# Patient Record
Sex: FEMALE | Race: BLACK OR AFRICAN AMERICAN | Employment: OTHER | URBAN - METROPOLITAN AREA
[De-identification: names, ages, dates, MRNs, and addresses within clinical notes are randomized per-mention and may not be internally consistent; named-entity substitution may affect disease eponyms.]

---

## 2021-12-04 ENCOUNTER — HOSPITAL ENCOUNTER (EMERGENCY)
Age: 76
Discharge: HOME | End: 2021-12-04
Attending: STUDENT IN AN ORGANIZED HEALTH CARE EDUCATION/TRAINING PROGRAM
Payer: MEDICARE

## 2021-12-04 VITALS
DIASTOLIC BLOOD PRESSURE: 88 MMHG | HEART RATE: 87 BPM | HEIGHT: 60 IN | SYSTOLIC BLOOD PRESSURE: 162 MMHG | WEIGHT: 180 LBS | BODY MASS INDEX: 35.34 KG/M2 | TEMPERATURE: 98.1 F | OXYGEN SATURATION: 99 % | RESPIRATION RATE: 16 BRPM

## 2021-12-04 DIAGNOSIS — M79.645 PAIN OF FINGER OF LEFT HAND: Primary | ICD-10-CM

## 2021-12-04 PROCEDURE — 6360000002 HC RX W HCPCS: Performed by: STUDENT IN AN ORGANIZED HEALTH CARE EDUCATION/TRAINING PROGRAM

## 2021-12-04 PROCEDURE — 99283 EMERGENCY DEPT VISIT LOW MDM: CPT

## 2021-12-04 PROCEDURE — 90471 IMMUNIZATION ADMIN: CPT | Performed by: STUDENT IN AN ORGANIZED HEALTH CARE EDUCATION/TRAINING PROGRAM

## 2021-12-04 PROCEDURE — 90715 TDAP VACCINE 7 YRS/> IM: CPT | Performed by: STUDENT IN AN ORGANIZED HEALTH CARE EDUCATION/TRAINING PROGRAM

## 2021-12-04 RX ORDER — MUPIROCIN CALCIUM 20 MG/G
CREAM TOPICAL
Qty: 15 G | Refills: 0 | Status: SHIPPED | OUTPATIENT
Start: 2021-12-04 | End: 2022-01-03

## 2021-12-04 RX ADMIN — TETANUS TOXOID, REDUCED DIPHTHERIA TOXOID AND ACELLULAR PERTUSSIS VACCINE, ADSORBED 0.5 ML: 5; 2.5; 8; 8; 2.5 SUSPENSION INTRAMUSCULAR at 16:58

## 2021-12-04 ASSESSMENT — ENCOUNTER SYMPTOMS
RHINORRHEA: 0
DIARRHEA: 0
VOMITING: 0
SHORTNESS OF BREATH: 0
ABDOMINAL PAIN: 0
COUGH: 0
CONSTIPATION: 0
NAUSEA: 0

## 2021-12-04 ASSESSMENT — PAIN DESCRIPTION - LOCATION: LOCATION: FINGER (COMMENT WHICH ONE)

## 2021-12-04 ASSESSMENT — PAIN DESCRIPTION - ORIENTATION: ORIENTATION: LEFT

## 2021-12-04 ASSESSMENT — PAIN SCALES - GENERAL: PAINLEVEL_OUTOF10: 5

## 2021-12-04 ASSESSMENT — PAIN DESCRIPTION - PAIN TYPE: TYPE: ACUTE PAIN

## 2021-12-04 NOTE — ED TRIAGE NOTES
Pt arrived to ED with pain in left ring finger after trying to get ring off. States she has had swelling in left hand for 2 weeks. [facility] ring [facility] today.

## 2021-12-04 NOTE — ED PROVIDER NOTES
[address] [facility] RESIDENT NOTE       Date of evaluation: 12/4/2021    Chief Complaint     Finger Pain (left ring finger pain after trying to take ring off)      History of Present Illness     Mary Soria is a 67 y.o. female who presents to the emergency department for finger pain. Patient previously had 2 rings on her left ring finger. She noticed increasing swelling to the left ring finger and had concerns that she was unable to get the rings off. She saw her jeweler today who cut off one of the rings. Beneath the ring there was swelling and discoloration of the skin. Patient was concerned and presents to the emergency department for evaluation. Patient has had increasing pain since the ring was cut off which she currently rates a 5/10 in severity. She denies any other trauma or injury. Review of Systems     Review of Systems   Constitutional: Negative for chills and fever. HENT: Negative for congestion and rhinorrhea. Respiratory: Negative for cough and shortness of breath. Cardiovascular: Negative for chest pain and leg swelling. Gastrointestinal: Negative for abdominal pain, constipation, diarrhea, nausea and vomiting. Genitourinary: Negative for dysuria and hematuria. Musculoskeletal: Positive for arthralgias. Left ring finger pain and swelling   Skin: Negative for rash. Neurological: Negative for syncope, light-headedness and headaches. Psychiatric/Behavioral: Negative for behavioral problems. All other systems reviewed and are negative. Past Medical, Surgical, Family, and Social History     She has no past medical history on file. She has no past surgical history on file. Her family history is not on file. She reports that she has never smoked. She has never used smokeless tobacco. She reports current alcohol use. She reports that she does not use drugs.     Medications     Previous Medications    No medications on file       Allergies     She is allergic to penicillins. Physical Exam     INITIAL VITALS: BP: (!) 156/82, Temp: 98.1 °F (36.7 °C), Pulse: 92, Resp: 18, SpO2: 94 %   Physical Exam  Vitals and nursing note reviewed. Constitutional:       General: She is not in acute distress. Appearance: Normal appearance. She is not toxic-appearing. HENT:      Head: Normocephalic and atraumatic. Nose: Nose normal.      Mouth/Throat:      Mouth: Mucous membranes are moist.   Eyes:      Extraocular Movements: Extraocular movements intact. Pupils: Pupils are equal, round, and reactive to light. Cardiovascular:      Rate and Rhythm: Normal rate and regular rhythm. Pulmonary:      Effort: Pulmonary effort is normal. No respiratory distress. Abdominal:      General: Abdomen is flat. There is no distension. Palpations: Abdomen is soft. Musculoskeletal:         General: Normal range of motion. Cervical back: Normal range of motion and neck supple. Comments: Left ring finger with ring in place. Just distal to the ring there is skin discoloration with mild erythema and mild swelling. Associated tenderness to palpation. No signs of infection. Skin:     General: Skin is warm and dry. Capillary Refill: Capillary refill takes less than 2 seconds. Findings: No rash. Neurological:      General: No focal deficit present. Mental Status: She is alert and oriented to person, place, and time. Psychiatric:         Mood and Affect: Mood normal.         Behavior: Behavior normal.         DiagnosticResults     EKG   None. RADIOLOGY:  No orders to display       LABS:   No results found for this visit on 12/04/21. ED BEDSIDE ULTRASOUND:  None. RECENT VITALS:  BP: (!) 156/82, Temp: 98.1 °F (36.7 °C), Pulse: 92,Resp: 18, SpO2: 94 %     Procedures     Ring removal was completed at bedside without any need for sedation or anesthetization.   Patient was consented verbally and understood the risks and benefits of the procedure. Ring cutter used with success in removal of ring. The procedure was uncomplicated. ED Course     Nursing Notes, Past Medical Hx, Past Surgical Hx, Social Hx, Allergies, and Family Hx were reviewed. The patient was given the followingmedications:  Orders Placed This Encounter   Medications    Tetanus-Diphth-Acell Pertussis (BOOSTRIX) injection 0.5 mL    mupirocin (BACTROBAN) 2 % cream     Sig: Apply 3 times daily. Dispense:  15 g     Refill:  0       CONSULTS:  None    MEDICAL DECISION MAKING / ASSESSMENT / Amira Boas is a 67 y.o. female presents to the emergency department with left ring finger pain and swelling after removal of the ring at the [facility]. Patient has another ring in place. Vital signs are notable for hypertension without any other abnormalities. Physical exam reveals swelling and erythema distal to the current ring on the finger. There is no evidence of active infection. Plan was made to remove the remaining ring with the ring cutter which was successful. Given the appearance of the ring and the use of the ring cutter, tetanus was updated here in the emergency department. Patient was given a prescription for mupirocin ointment for antibiotic prophylaxis. We discussed return instructions as well as follow-up with primary care provider. Patient expressed understanding is comfortable with this plan. She was ultimately discharged home in stable condition. This patient was also evaluated by the attending physician. All care plans werediscussed and agreed upon. Clinical Impression     1. Pain of finger of left hand        Disposition     PATIENT REFERRED TO:  No follow-up provider specified. DISCHARGE MEDICATIONS:  New Prescriptions    MUPIROCIN (BACTROBAN) 2 % CREAM    Apply 3 times daily.        DISPOSITION Decision To Discharge 12/04/2021 04:46:39 PM     Marissa Hall MD  12/04/21 9003

## 2022-03-27 ENCOUNTER — HOSPITAL ENCOUNTER (EMERGENCY)
Age: 77
Discharge: HOME | End: 2022-03-27
Attending: EMERGENCY MEDICINE
Payer: MEDICARE

## 2022-03-27 ENCOUNTER — APPOINTMENT (OUTPATIENT)
Dept: CT IMAGING | Age: 77
End: 2022-03-27
Payer: MEDICARE

## 2022-03-27 ENCOUNTER — APPOINTMENT (OUTPATIENT)
Dept: GENERAL RADIOLOGY | Age: 77
End: 2022-03-27
Payer: MEDICARE

## 2022-03-27 VITALS
DIASTOLIC BLOOD PRESSURE: 81 MMHG | WEIGHT: 178 LBS | OXYGEN SATURATION: 100 % | SYSTOLIC BLOOD PRESSURE: 138 MMHG | HEIGHT: 60 IN | TEMPERATURE: 98.2 F | BODY MASS INDEX: 34.95 KG/M2 | RESPIRATION RATE: 16 BRPM | HEART RATE: 97 BPM

## 2022-03-27 DIAGNOSIS — S63.502A SPRAIN OF LEFT WRIST, INITIAL ENCOUNTER: ICD-10-CM
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
## 2022-03-27 LAB
EKG ATRIAL RATE: 94 BPM
EKG DIAGNOSIS: NORMAL
EKG P AXIS: 62 DEGREES
EKG P-R INTERVAL: 154 MS
EKG Q-T INTERVAL: 384 MS
EKG QRS DURATION: 72 MS
EKG QTC CALCULATION (BAZETT): 480 MS
EKG R AXIS: 7 DEGREES
EKG T AXIS: 70 DEGREES
EKG VENTRICULAR RATE: 94 BPM

## 2022-03-27 PROCEDURE — 73030 X-RAY EXAM OF SHOULDER: CPT

## 2022-03-27 PROCEDURE — 73130 X-RAY EXAM OF HAND: CPT

## 2022-03-27 PROCEDURE — 73110 X-RAY EXAM OF WRIST: CPT

## 2022-03-27 PROCEDURE — 99282 EMERGENCY DEPT VISIT SF MDM: CPT

## 2022-03-27 PROCEDURE — 6370000000 HC RX 637 (ALT 250 FOR IP): Performed by: EMERGENCY MEDICINE

## 2022-03-27 PROCEDURE — 70450 CT HEAD/BRAIN W/O DYE: CPT

## 2022-03-27 PROCEDURE — 73080 X-RAY EXAM OF ELBOW: CPT

## 2022-03-27 PROCEDURE — 93005 ELECTROCARDIOGRAM TRACING: CPT | Performed by: EMERGENCY MEDICINE

## 2022-03-27 RX ORDER — IBUPROFEN 400 MG/1
400 TABLET ORAL ONCE
Status: DISCONTINUED | OUTPATIENT
Start: 2022-03-27 | End: 2022-03-27 | Stop reason: HOSPADM

## 2022-03-27 RX ORDER — IBUPROFEN 600 MG/1
600 TABLET ORAL EVERY 6 HOURS PRN
Qty: 28 TABLET | Refills: 0 | Status: SHIPPED | OUTPATIENT
Start: 2022-03-27 | End: 2022-04-03

## 2022-03-27 RX ORDER — ACETAMINOPHEN 325 MG/1
650 TABLET ORAL ONCE
Status: COMPLETED | OUTPATIENT
Start: 2022-03-27 | End: 2022-03-27

## 2022-03-27 RX ADMIN — ACETAMINOPHEN 650 MG: 325 TABLET ORAL at 08:23

## 2022-03-27 ASSESSMENT — PAIN SCALES - GENERAL: PAINLEVEL_OUTOF10: 8

## 2022-03-27 ASSESSMENT — ENCOUNTER SYMPTOMS
BACK PAIN: 0
ABDOMINAL PAIN: 0
COUGH: 0
SORE THROAT: 0

## 2022-03-27 NOTE — ED PROVIDER NOTES
[address] [facility]          ATTENDING PHYSICIAN NOTE       Date of evaluation: 3/27/2022    Chief Complaint     Fall (slipped and fell @ [facility] last night landed on right shoulder and right wrist, then fell again on the way home and landed on left wrist and hit head without loc)      History of Present Illness     Denise Jiang is a right-hand-dominant 65 y.o. female who presents to the emergency department for evaluation after 2 falls last evening. The first fall occurred when she slipped at the [facility], landing on her dominant right hand. She has mild soreness in the right wrist and moderate pain with movement of her right shoulder. Symptoms are worse this morning than they were last night. She did not strike her head. When she was going home, she tripped on and out of place rug, catching herself with her left arm. She did strike her forehead but did not lose consciousness. She has minor frontal throbbing headache that does not radiate, and is not associated with vision change or nausea, neck pain, or weakness. The worst of her pain is in her left wrist, ulnar aspect, that is worse to touch or movement. No associated numbness or tingling. She did not seek care last night because she was hoping her symptoms would resolve. She did not take any medications prior to arrival for her discomfort. On review of systems, she complains of minor central chest discomfort which she has frequently and is unchanged in character, quality, location, and any other details. She would not seek medical evaluation for the chest pain independently and is not concerned by it. Otherwise, no cough, fever, shortness of breath, palpitations, leg pain or swelling. Review of Systems     Review of Systems   Constitutional: Negative for fever. HENT: Negative for sore throat. Respiratory: Negative for cough. Cardiovascular: Negative for palpitations. Gastrointestinal: Negative for abdominal pain. Genitourinary: Negative for flank pain. Musculoskeletal: Negative for back pain and neck pain. Hematological: Does not bruise/bleed easily. All other systems reviewed and are negative. Past Medical, Surgical, Family, and Social History     **See ED course for additional past medical history, surgical history, and medications from outside hospital chart**    She has no past medical history on file. She has no past surgical history on file. Her family history is not on file. She reports that she has never smoked. She has never used smokeless tobacco. She reports current alcohol use. She reports that she does not use drugs. Medications     Previous Medications    No medications on file       Allergies     She is allergic to penicillins. Physical Exam     INITIAL VITALS: /81   Pulse 97   Temp 98.2 °F (36.8 °C) (Oral)   Resp 16   Ht 5' (1.524 m)   Wt 178 lb (80.7 kg)   SpO2 100%   BMI 34.76 kg/m²    General: Well developed, well nourished female in no acute distress. HEENT: Sclera white, conjunctiva pink, mucous membranes moist and oropharynx clear  Neck: Supple, no meningismus or JVD, normal range of motion of the neck without difficulty, no midline cervical spine tenderness  Respirations: Unlabored with symmetric chest rise and fall  CV: Regular rate and rhythm with strong distal pulses  Abd: Soft without rebound guarding distention or focal tenderness  Musculoskeletal: Moves all extremities with no signs of orthopedic trauma or edema. No midline thoracolumbar tenderness to palpation. She does have tenderness to palpation over the right anterior glenohumeral joint without deformity. Also tenderness palpation of the ulnar aspect of the left wrist without palpable deformity. Mild pain with range of motion of the left wrist.  Skin: Warm and dry with no rashes or lesions. Neuro: Awake and alert with no focal neurologic deficits. Normal speech and gait. Psych: Mood and affect are normal.    Diagnostic Results     EKG   Indication: Chest discomfort. Interpreted by me. Normal sinus rhythm, normal axis and intervals, rate is 94.  2 PVCs are seen. No ST segment or T wave changes. Interpretation: Normal sinus rhythm with occasional PVCs, no signs of acute ischemia, infarction, or serious dysrhythmia. RADIOLOGY:  XR SHOULDER RIGHT (MIN 2 VIEWS)   Final Result      No acute fracture seen. XR SHOULDER LEFT (MIN 2 VIEWS)   Final Result      No acute fracture seen. XR ELBOW LEFT (MIN 3 VIEWS)   Final Result      Tiny bony density noted adjacent to olecranon of doubtful significance but should be correlated clinically to exclude acute avulsion fracture. Otherwise no fracture seen. XR HAND RIGHT (MIN 3 VIEWS)   Final Result      No acute fracture seen. XR HAND LEFT (MIN 3 VIEWS)   Final Result      No acute fracture seen. XR WRIST RIGHT (MIN 3 VIEWS)   Final Result      No acute fracture seen. XR WRIST LEFT (MIN 3 VIEWS)   Final Result      No acute fracture seen. XR ELBOW RIGHT (MIN 3 VIEWS)   Final Result      No acute fracture seen. CT Head WO Contrast   Final Result      Chronic changes as above. No acute hemorrhage or mass effect identified. LABS:   Results for orders placed or performed during the hospital encounter of 03/27/22   EKG 12 Lead   Result Value Ref Range    Ventricular Rate 94 BPM    Atrial Rate 94 BPM    P-R Interval 154 ms    QRS Duration 72 ms    Q-T Interval 384 ms    QTc Calculation (Bazett) 480 ms    P Axis 62 degrees    R Axis 7 degrees    T Axis 70 degrees    Diagnosis       EKG performed in ER and to be interpreted by ER physician. Confirmed by MD, ER ([id number]),  Bowen Joe ([phone]) on 3/27/2022 8:22:06 AM       RECENT VITALS:  BP: 138/81, Temp: 98.2 °F (36.8 °C), Pulse: 97, Resp: 16, SpO2: 100 %       ED Course     Nursing Notes, Past Medical Hx, Past Surgical Hx, Social Hx, Allergies, and Family Hx were reviewed. The patient was given the following medications:  Orders Placed This Encounter   Medications    acetaminophen (TYLENOL) tablet 650 mg    ibuprofen (ADVIL;MOTRIN) tablet 400 mg    ibuprofen (ADVIL;MOTRIN) 600 MG tablet     Sig: Take 1 tablet by mouth every 6 hours as needed for Pain     Dispense:  28 tablet     Refill:  0     ED Course as of 03/27/22 0850   Sun Mar 27, 2022   0715 From 6/1/21 Ann Hurst PCP Note:  Current Outpatient Medications   Medication Sig   · amLODIPine Take 1 tablet (10 mg total) by mouth daily. · aspirin Take 1 tablet (81 mg total) by mouth daily with breakfast. Indications: myocardial infarction prevention   · atorvastatin Take 1 tablet (40 mg total) by mouth daily. · isosorbide mononitrate Take 0.5 tablets (15 mg total) by mouth daily. · lisinopriL Take 1 tablet (5 mg total) by mouth daily. · metFORMIN Take 1 tablet (850 mg total) by mouth 2 times a day with meals. · metoprolol succinate Take 1 tablet (25 mg total) by mouth daily. · nitroGLYCERIN Dissolve 1 tablet under the tongue for chest pain. Repeat if needed every 5 minutes for 2 more doses. Indications: angina     CAD; S/P coronary artery stent placement  Subclinical Hyperthyroidism  Clinical Depression  Hypertension  Diabetes []   0846 XR ELBOW LEFT (MIN 3 VIEWS)  No pain to palpation, flexion extension, pronation supination to the left elbow []      ED Course User Index  [JM] Yaw Osuna MD     She was seen and examined on arrival to the treatment area. Nursing notes and computerized medical records were reviewed. Symptoms treated in the emergency department. I personally discussed results, aftercare, and return precautions, and she expresses understanding.     MEDICAL DECISION MAKING / ASSESSMENT / Sunshine Durán is a 65 y.o. female presents to the emergency department for evaluation of bilateral upper extremity injuries after 2 mechanical slip and falls. She did strike her head. CT scan of the head was obtained based on age and aspirin use, with low clinical suspicion for TBI. X-rays were obtained of areas that were causing her discomfort. No clinical correlation for subtle abnormality of the left elbow x-ray. I believe she is suffering from strain/sprains, and can be treated conservatively. No indication based on overall evaluation that there was a preceding medical event prior to either or both of these falls. She does mention minor chronic stable chest discomfort, with a reassuring EKG. I am not concerned for active acute coronary syndrome or other intrathoracic process. Clinical Impression     1. Fall on same level from slipping, tripping or stumbling, initial encounter    2.  Sprain of left wrist, initial encounter        Disposition     PATIENT REFERRED TO:  MD Eusebio Munguia [place] [id number] [facility]       For re-evaluation, follow-up, and discuss ED visit    [facility] Emergency Department  [address] [place] [address]  [phone]    As needed, If symptoms worsen      DISCHARGE MEDICATIONS:  New Prescriptions    IBUPROFEN (ADVIL;MOTRIN) 600 MG TABLET    Take 1 tablet by mouth every 6 hours as needed for Pain       DISPOSITION    Discharged home in good condition     Derek Dunn MD  03/27/22 8253

## 2023-09-20 ENCOUNTER — HOSPITAL ENCOUNTER (EMERGENCY)
Age: 78
Discharge: HOME | End: 2023-09-20
Attending: EMERGENCY MEDICINE
Payer: MEDICARE

## 2023-09-20 VITALS
TEMPERATURE: 98.1 F | RESPIRATION RATE: 20 BRPM | HEIGHT: 60 IN | BODY MASS INDEX: 34 KG/M2 | DIASTOLIC BLOOD PRESSURE: 61 MMHG | OXYGEN SATURATION: 96 % | HEART RATE: 85 BPM | WEIGHT: 173.19 LBS | SYSTOLIC BLOOD PRESSURE: 140 MMHG

## 2023-09-20 DIAGNOSIS — R21 RASH AND NONSPECIFIC SKIN ERUPTION: Primary | ICD-10-CM

## 2023-09-20 LAB
ALBUMIN SERPL-MCNC: 4.2 G/DL (ref 3.4–5)
ALBUMIN/GLOB SERPL: 1.2 {RATIO} (ref 1.1–2.2)
ALP SERPL-CCNC: 53 U/L (ref 40–129)
ALT SERPL-CCNC: <5 U/L (ref 10–40)
ANION GAP SERPL CALCULATED.3IONS-SCNC: 9 MMOL/L (ref 3–16)
AST SERPL-CCNC: 13 U/L (ref 15–37)
BASOPHILS # BLD: 0.1 K/UL (ref 0–0.2)
BASOPHILS NFR BLD: 1.1 %
BILIRUB SERPL-MCNC: 0.3 MG/DL (ref 0–1)
BUN SERPL-MCNC: 16 MG/DL (ref 7–20)
CALCIUM SERPL-MCNC: 9.3 MG/DL (ref 8.3–10.6)
CHLORIDE SERPL-SCNC: 103 MMOL/L (ref 99–110)
CO2 SERPL-SCNC: 27 MMOL/L (ref 21–32)
CREAT SERPL-MCNC: 0.8 MG/DL (ref 0.6–1.2)
DEPRECATED RDW RBC AUTO: 13 % (ref 12.4–15.4)
EOSINOPHIL # BLD: 0.7 K/UL (ref 0–0.6)
EOSINOPHIL NFR BLD: 13.9 %
GFR SERPLBLD CREATININE-BSD FMLA CKD-EPI: >60 ML/MIN/{1.73_M2}
GLUCOSE SERPL-MCNC: 180 MG/DL (ref 70–99)
HCT VFR BLD AUTO: 35.3 % (ref 36–48)
HGB BLD-MCNC: 11.8 G/DL (ref 12–16)
LYMPHOCYTES # BLD: 1.2 K/UL (ref 1–5.1)
LYMPHOCYTES NFR BLD: 23.9 %
MCH RBC QN AUTO: 28.1 PG (ref 26–34)
MCHC RBC AUTO-ENTMCNC: 33.4 G/DL (ref 31–36)
MCV RBC AUTO: 84.1 FL (ref 80–100)
MONOCYTES # BLD: 0.5 K/UL (ref 0–1.3)
MONOCYTES NFR BLD: 10.7 %
NEUTROPHILS # BLD: 2.5 K/UL (ref 1.7–7.7)
NEUTROPHILS NFR BLD: 50.4 %
NT-PROBNP SERPL-MCNC: 321 PG/ML (ref 0–449)
PLATELET # BLD AUTO: 233 K/UL (ref 135–450)
PMV BLD AUTO: 8.6 FL (ref 5–10.5)
POTASSIUM SERPL-SCNC: 4 MMOL/L (ref 3.5–5.1)
PROT SERPL-MCNC: 7.7 G/DL (ref 6.4–8.2)
RBC # BLD AUTO: 4.19 M/UL (ref 4–5.2)
SODIUM SERPL-SCNC: 139 MMOL/L (ref 136–145)
WBC # BLD AUTO: 4.9 K/UL (ref 4–11)

## 2023-09-20 PROCEDURE — 99284 EMERGENCY DEPT VISIT MOD MDM: CPT

## 2023-09-20 PROCEDURE — 80053 COMPREHEN METABOLIC PANEL: CPT

## 2023-09-20 PROCEDURE — 6360000002 HC RX W HCPCS: Performed by: EMERGENCY MEDICINE

## 2023-09-20 PROCEDURE — 96375 TX/PRO/DX INJ NEW DRUG ADDON: CPT

## 2023-09-20 PROCEDURE — 85025 COMPLETE CBC W/AUTO DIFF WBC: CPT

## 2023-09-20 PROCEDURE — 96374 THER/PROPH/DIAG INJ IV PUSH: CPT

## 2023-09-20 PROCEDURE — 83880 ASSAY OF NATRIURETIC PEPTIDE: CPT

## 2023-09-20 RX ORDER — DIPHENHYDRAMINE HYDROCHLORIDE 50 MG/ML
25 INJECTION INTRAMUSCULAR; INTRAVENOUS ONCE
Status: COMPLETED | OUTPATIENT
Start: 2023-09-20 | End: 2023-09-20

## 2023-09-20 RX ORDER — HYDROXYZINE HYDROCHLORIDE 25 MG/1
25 TABLET, FILM COATED ORAL EVERY 8 HOURS PRN
Qty: 30 TABLET | Refills: 0 | Status: SHIPPED | OUTPATIENT
Start: 2023-09-20 | End: 2023-09-30

## 2023-09-20 RX ORDER — PREDNISONE 20 MG/1
40 TABLET ORAL DAILY
Qty: 10 TABLET | Refills: 0 | Status: SHIPPED | OUTPATIENT
Start: 2023-09-20 | End: 2023-09-25

## 2023-09-20 RX ORDER — MORPHINE SULFATE 2 MG/ML
2 INJECTION, SOLUTION INTRAMUSCULAR; INTRAVENOUS ONCE
Status: COMPLETED | OUTPATIENT
Start: 2023-09-20 | End: 2023-09-20

## 2023-09-20 RX ADMIN — MORPHINE SULFATE 2 MG: 2 INJECTION, SOLUTION INTRAMUSCULAR; INTRAVENOUS at 15:34

## 2023-09-20 RX ADMIN — DIPHENHYDRAMINE HYDROCHLORIDE 25 MG: 50 INJECTION INTRAMUSCULAR; INTRAVENOUS at 15:34

## 2023-09-20 ASSESSMENT — PAIN DESCRIPTION - ORIENTATION: ORIENTATION: RIGHT;LEFT

## 2023-09-20 ASSESSMENT — PAIN SCALES - GENERAL
PAINLEVEL_OUTOF10: 10
PAINLEVEL_OUTOF10: 10

## 2023-09-20 ASSESSMENT — PAIN DESCRIPTION - DESCRIPTORS
DESCRIPTORS: BURNING;HYPERSENSITIVITY
DESCRIPTORS: HYPERSENSITIVITY

## 2023-09-20 ASSESSMENT — PAIN - FUNCTIONAL ASSESSMENT: PAIN_FUNCTIONAL_ASSESSMENT: 0-10

## 2023-09-20 ASSESSMENT — PAIN DESCRIPTION - LOCATION
LOCATION: LEG
LOCATION: LEG

## 2023-09-20 NOTE — ED PROVIDER NOTES
EMERGENCY MEDICINE ATTENDING NOTE  Irma Krause. Eveline Recinos., ROLAND GOLDMAN, [facility]        CHIEF COMPLAINT  Chief Complaint   Patient presents with    Eczema     Pt presents with eczema of the BLE for 4.5- 5 months. BLE are very painful and itchy. Pt has tried OTC lotions and NSAIDS. PCP recommended ice. Has an apt with dermatologist in November [address] [facility]  Laura Zepeda is a 77 y.o. female who presents to the ED for evaluation of pain and itchiness to bilateral lower extremities. Patient started having a rash there approximately 5 months ago. She has been evaluated by her doctor for it and has had a few different treatments to try to help. Patient does have significant eczema which is what the building. Is as to what is causing this. States at some point is to the point that she itches the skin right off of her legs. Has noticed little bit of swelling to the areas as well. Denies fevers or chills. States the right leg is much worse than the left. She does have a follow-up with dermatology regarding it but it is not till November. Nursing/triage notes reviewed. No other complaints, modifying factors or associated symptoms. REVIEW OF SYSTEMS:  All systems are reviewed and are negative unless noted in the HPI. PAST MEDICAL HISTORY  History reviewed. No pertinent past medical history. SURGICAL HISTORY  History reviewed. No pertinent surgical history. FAMILY HISTORY  History reviewed. No pertinent family history. SOCIAL HISTORY  Social History     Socioeconomic History    Marital status:       Spouse name: Not on file    Number of children: Not on file    Years of education: Not on file    Highest education level: Not on file   Occupational History    Not on file   Tobacco Use    Smoking status: Never    Smokeless tobacco: Never   Substance and Sexual Activity    Alcohol use: Yes     Comment: occasionally    Drug use: Never    Sexual activity: Not on file   Other

## 2023-11-29 ENCOUNTER — HOSPITAL ENCOUNTER (INPATIENT)
Age: 78
LOS: 1 days | Discharge: ANOTHER HOSPITAL | DRG: 215 | End: 2023-11-29
Attending: EMERGENCY MEDICINE | Admitting: INTERNAL MEDICINE
Payer: MEDICARE

## 2023-11-29 ENCOUNTER — APPOINTMENT (OUTPATIENT)
Dept: GENERAL RADIOLOGY | Age: 78
DRG: 215 | End: 2023-11-29
Payer: MEDICARE

## 2023-11-29 VITALS
OXYGEN SATURATION: 100 % | HEIGHT: 60 IN | BODY MASS INDEX: 32.68 KG/M2 | RESPIRATION RATE: 25 BRPM | HEART RATE: 110 BPM | WEIGHT: 166.45 LBS | DIASTOLIC BLOOD PRESSURE: 63 MMHG | SYSTOLIC BLOOD PRESSURE: 83 MMHG

## 2023-11-29 DIAGNOSIS — I21.3 ST ELEVATION MYOCARDIAL INFARCTION (STEMI), UNSPECIFIED ARTERY (HCC): Primary | ICD-10-CM

## 2023-11-29 PROBLEM — I25.10 CORONARY ARTERY DISEASE: Status: ACTIVE | Noted: 2023-11-29

## 2023-11-29 PROBLEM — I49.01 VENTRICULAR FIBRILLATION (HCC): Status: ACTIVE | Noted: 2023-11-29

## 2023-11-29 PROBLEM — I25.5 ISCHEMIC CARDIOMYOPATHY: Status: ACTIVE | Noted: 2023-11-29

## 2023-11-29 LAB
ANION GAP SERPL CALCULATED.3IONS-SCNC: 17 MMOL/L (ref 3–16)
BASOPHILS # BLD: 0 K/UL (ref 0–0.2)
BASOPHILS NFR BLD: 0.8 %
BUN SERPL-MCNC: 22 MG/DL (ref 7–20)
CALCIUM SERPL-MCNC: 8.9 MG/DL (ref 8.3–10.6)
CHLORIDE SERPL-SCNC: 105 MMOL/L (ref 99–110)
CO2 SERPL-SCNC: 17 MMOL/L (ref 21–32)
CREAT SERPL-MCNC: 1.6 MG/DL (ref 0.6–1.2)
DEPRECATED RDW RBC AUTO: 13.1 % (ref 12.4–15.4)
EKG DIAGNOSIS: NORMAL
EKG Q-T INTERVAL: 368 MS
EKG QRS DURATION: 112 MS
EKG QTC CALCULATION (BAZETT): 507 MS
EKG R AXIS: 22 DEGREES
EKG T AXIS: 28 DEGREES
EKG VENTRICULAR RATE: 114 BPM
EOSINOPHIL # BLD: 0.1 K/UL (ref 0–0.6)
EOSINOPHIL NFR BLD: 1.6 %
GFR SERPLBLD CREATININE-BSD FMLA CKD-EPI: 32 ML/MIN/{1.73_M2}
GLUCOSE SERPL-MCNC: 245 MG/DL (ref 70–99)
HCT VFR BLD AUTO: 38.1 % (ref 36–48)
HGB BLD-MCNC: 12.2 G/DL (ref 12–16)
LYMPHOCYTES # BLD: 1.9 K/UL (ref 1–5.1)
LYMPHOCYTES NFR BLD: 33.3 %
MCH RBC QN AUTO: 26.9 PG (ref 26–34)
MCHC RBC AUTO-ENTMCNC: 31.9 G/DL (ref 31–36)
MCV RBC AUTO: 84.4 FL (ref 80–100)
MONOCYTES # BLD: 0.6 K/UL (ref 0–1.3)
MONOCYTES NFR BLD: 11.4 %
NEUTROPHILS # BLD: 3 K/UL (ref 1.7–7.7)
NEUTROPHILS NFR BLD: 52.9 %
NT-PROBNP SERPL-MCNC: 5917 PG/ML (ref 0–449)
PLATELET # BLD AUTO: 278 K/UL (ref 135–450)
PMV BLD AUTO: 8.6 FL (ref 5–10.5)
POC ACT LR: 295 SEC
POC ACT LR: 323 SEC
POC ACT LR: 379 SEC
POC ACT LR: >400 SEC
POTASSIUM SERPL-SCNC: 3.8 MMOL/L (ref 3.5–5.1)
RBC # BLD AUTO: 4.52 M/UL (ref 4–5.2)
SODIUM SERPL-SCNC: 139 MMOL/L (ref 136–145)
TROPONIN, HIGH SENSITIVITY: 41 NG/L (ref 0–14)
WBC # BLD AUTO: 5.7 K/UL (ref 4–11)

## 2023-11-29 PROCEDURE — 2500000003 HC RX 250 WO HCPCS

## 2023-11-29 PROCEDURE — 6360000002 HC RX W HCPCS: Performed by: INTERNAL MEDICINE

## 2023-11-29 PROCEDURE — C1769 GUIDE WIRE: HCPCS

## 2023-11-29 PROCEDURE — 85025 COMPLETE CBC W/AUTO DIFF WBC: CPT

## 2023-11-29 PROCEDURE — 85347 COAGULATION TIME ACTIVATED: CPT

## 2023-11-29 PROCEDURE — C1753 CATH, INTRAVAS ULTRASOUND: HCPCS

## 2023-11-29 PROCEDURE — 99291 CRITICAL CARE FIRST HOUR: CPT | Performed by: INTERNAL MEDICINE

## 2023-11-29 PROCEDURE — 2709999900 HC NON-CHARGEABLE SUPPLY

## 2023-11-29 PROCEDURE — 99291 CRITICAL CARE FIRST HOUR: CPT

## 2023-11-29 PROCEDURE — 71045 X-RAY EXAM CHEST 1 VIEW: CPT

## 2023-11-29 PROCEDURE — 02703ZZ DILATION OF CORONARY ARTERY, ONE ARTERY, PERCUTANEOUS APPROACH: ICD-10-PCS | Performed by: INTERNAL MEDICINE

## 2023-11-29 PROCEDURE — 93005 ELECTROCARDIOGRAM TRACING: CPT | Performed by: EMERGENCY MEDICINE

## 2023-11-29 PROCEDURE — 02HA3RZ INSERTION OF SHORT-TERM EXTERNAL HEART ASSIST SYSTEM INTO HEART, PERCUTANEOUS APPROACH: ICD-10-PCS | Performed by: INTERNAL MEDICINE

## 2023-11-29 PROCEDURE — B2151ZZ FLUOROSCOPY OF LEFT HEART USING LOW OSMOLAR CONTRAST: ICD-10-PCS | Performed by: INTERNAL MEDICINE

## 2023-11-29 PROCEDURE — 5A12012 PERFORMANCE OF CARDIAC OUTPUT, SINGLE, MANUAL: ICD-10-PCS | Performed by: INTERNAL MEDICINE

## 2023-11-29 PROCEDURE — 93460 R&L HRT ART/VENTRICLE ANGIO: CPT | Performed by: INTERNAL MEDICINE

## 2023-11-29 PROCEDURE — 36415 COLL VENOUS BLD VENIPUNCTURE: CPT

## 2023-11-29 PROCEDURE — C1887 CATHETER, GUIDING: HCPCS

## 2023-11-29 PROCEDURE — 96374 THER/PROPH/DIAG INJ IV PUSH: CPT

## 2023-11-29 PROCEDURE — 6360000002 HC RX W HCPCS: Performed by: EMERGENCY MEDICINE

## 2023-11-29 PROCEDURE — 33990 INSJ PERQ VAD L HRT ARTERIAL: CPT | Performed by: INTERNAL MEDICINE

## 2023-11-29 PROCEDURE — 84484 ASSAY OF TROPONIN QUANT: CPT

## 2023-11-29 PROCEDURE — 92978 ENDOLUMINL IVUS OCT C 1ST: CPT

## 2023-11-29 PROCEDURE — 4A023N8 MEASUREMENT OF CARDIAC SAMPLING AND PRESSURE, BILATERAL, PERCUTANEOUS APPROACH: ICD-10-PCS | Performed by: INTERNAL MEDICINE

## 2023-11-29 PROCEDURE — B2111ZZ FLUOROSCOPY OF MULTIPLE CORONARY ARTERIES USING LOW OSMOLAR CONTRAST: ICD-10-PCS | Performed by: INTERNAL MEDICINE

## 2023-11-29 PROCEDURE — 92978 ENDOLUMINL IVUS OCT C 1ST: CPT | Performed by: INTERNAL MEDICINE

## 2023-11-29 PROCEDURE — 33990 INSJ PERQ VAD L HRT ARTERIAL: CPT

## 2023-11-29 PROCEDURE — C1725 CATH, TRANSLUMIN NON-LASER: HCPCS

## 2023-11-29 PROCEDURE — 6360000002 HC RX W HCPCS

## 2023-11-29 PROCEDURE — 80048 BASIC METABOLIC PNL TOTAL CA: CPT

## 2023-11-29 PROCEDURE — 93460 R&L HRT ART/VENTRICLE ANGIO: CPT

## 2023-11-29 PROCEDURE — 1200000000 HC SEMI PRIVATE

## 2023-11-29 PROCEDURE — B41F1ZZ FLUOROSCOPY OF RIGHT LOWER EXTREMITY ARTERIES USING LOW OSMOLAR CONTRAST: ICD-10-PCS | Performed by: INTERNAL MEDICINE

## 2023-11-29 PROCEDURE — 2580000003 HC RX 258: Performed by: EMERGENCY MEDICINE

## 2023-11-29 PROCEDURE — 92921 HC PRQ CARDIAC ANGIO ADDL ART: CPT

## 2023-11-29 PROCEDURE — 92941 PRQ TRLML REVSC TOT OCCL AMI: CPT | Performed by: INTERNAL MEDICINE

## 2023-11-29 PROCEDURE — 99152 MOD SED SAME PHYS/QHP 5/>YRS: CPT | Performed by: INTERNAL MEDICINE

## 2023-11-29 PROCEDURE — 5A0221D ASSISTANCE WITH CARDIAC OUTPUT USING IMPELLER PUMP, CONTINUOUS: ICD-10-PCS | Performed by: INTERNAL MEDICINE

## 2023-11-29 PROCEDURE — C1894 INTRO/SHEATH, NON-LASER: HCPCS

## 2023-11-29 PROCEDURE — 83880 ASSAY OF NATRIURETIC PEPTIDE: CPT

## 2023-11-29 PROCEDURE — B41G1ZZ FLUOROSCOPY OF LEFT LOWER EXTREMITY ARTERIES USING LOW OSMOLAR CONTRAST: ICD-10-PCS | Performed by: INTERNAL MEDICINE

## 2023-11-29 PROCEDURE — 2580000003 HC RX 258: Performed by: INTERNAL MEDICINE

## 2023-11-29 PROCEDURE — 92920 PRQ TRLUML C ANGIOP 1ART&/BR: CPT

## 2023-11-29 PROCEDURE — C1889 IMPLANT/INSERT DEVICE, NOC: HCPCS

## 2023-11-29 RX ORDER — HEPARIN SODIUM 1000 [USP'U]/ML
4000 INJECTION, SOLUTION INTRAVENOUS; SUBCUTANEOUS ONCE
Status: COMPLETED | OUTPATIENT
Start: 2023-11-29 | End: 2023-11-29

## 2023-11-29 RX ORDER — SODIUM CHLORIDE 9 MG/ML
INJECTION, SOLUTION INTRAVENOUS PRN
OUTPATIENT
Start: 2023-11-29

## 2023-11-29 RX ORDER — SODIUM CHLORIDE 0.9 % (FLUSH) 0.9 %
5-40 SYRINGE (ML) INJECTION PRN
OUTPATIENT
Start: 2023-11-29

## 2023-11-29 RX ORDER — HEPARIN SODIUM 1000 [USP'U]/ML
1000 INJECTION, SOLUTION INTRAVENOUS; SUBCUTANEOUS PRN
OUTPATIENT
Start: 2023-11-29

## 2023-11-29 RX ORDER — ACETAMINOPHEN 325 MG/1
650 TABLET ORAL EVERY 4 HOURS PRN
OUTPATIENT
Start: 2023-11-29

## 2023-11-29 RX ORDER — SODIUM CHLORIDE 0.9 % (FLUSH) 0.9 %
5-40 SYRINGE (ML) INJECTION EVERY 12 HOURS SCHEDULED
OUTPATIENT
Start: 2023-11-29

## 2023-11-29 RX ORDER — NITROGLYCERIN 20 MG/100ML
5-200 INJECTION INTRAVENOUS CONTINUOUS
Status: DISCONTINUED | OUTPATIENT
Start: 2023-11-29 | End: 2023-11-30 | Stop reason: HOSPADM

## 2023-11-29 RX ORDER — EPTIFIBATIDE 0.75 MG/ML
1 INJECTION, SOLUTION INTRAVENOUS CONTINUOUS
Status: DISCONTINUED | OUTPATIENT
Start: 2023-11-29 | End: 2023-11-30 | Stop reason: HOSPADM

## 2023-11-29 RX ORDER — HEPARIN SODIUM 10000 [USP'U]/100ML
300 INJECTION, SOLUTION INTRAVENOUS CONTINUOUS
OUTPATIENT
Start: 2023-11-29

## 2023-11-29 RX ADMIN — AMIODARONE HYDROCHLORIDE 1 MG/MIN: 50 INJECTION, SOLUTION INTRAVENOUS at 19:47

## 2023-11-29 RX ADMIN — AMIODARONE HYDROCHLORIDE 150 MG: 50 INJECTION, SOLUTION INTRAVENOUS at 17:57

## 2023-11-29 RX ADMIN — HEPARIN SODIUM 4000 UNITS: 1000 INJECTION INTRAVENOUS; SUBCUTANEOUS at 18:01

## 2023-11-29 ASSESSMENT — PAIN - FUNCTIONAL ASSESSMENT: PAIN_FUNCTIONAL_ASSESSMENT: 0-10

## 2023-11-29 ASSESSMENT — PAIN DESCRIPTION - DESCRIPTORS: DESCRIPTORS: ACHING

## 2023-11-29 ASSESSMENT — PAIN DESCRIPTION - FREQUENCY: FREQUENCY: CONTINUOUS

## 2023-11-29 ASSESSMENT — PAIN DESCRIPTION - ORIENTATION: ORIENTATION: MID

## 2023-11-29 ASSESSMENT — PAIN DESCRIPTION - PAIN TYPE: TYPE: ACUTE PAIN

## 2023-11-29 ASSESSMENT — PAIN SCALES - GENERAL: PAINLEVEL_OUTOF10: 3

## 2023-11-29 ASSESSMENT — PAIN DESCRIPTION - LOCATION: LOCATION: CHEST

## 2023-11-29 NOTE — ED PROVIDER NOTES
[facility]          ATTENDING PHYSICIAN NOTE       Date of evaluation: 11/29/2023    Chief Complaint     Chest Pain (Pt c/o n/v/d for a couple days, today mid sternal chest pains. Given 324 aspirin PTA)      History of Present Illness     Ashwin Adams is a 77 y.o. female who presents with chest pain. Patient recently discharged from  with a LAD and circumflex stent status post MI. History of diabetes JUAN not visit hypertension. Upon arrival patient awake answer simple questions that she is having pressure in her chest and her EKG showed a acute lateral wall MI    ASSESSMENT / PLAN  ([facility])     INITIAL VITALS: BP: (!) 84/53,  , Pulse: (!) 112, Respirations: 18, SpO2: 98 %      Ashwin Adams is a 77 y.o. female presents with chest pain and acute lateral MI status post stents at [facility] on November 14. Code STEMI called immediately. Patient shortly after calling code STEMI and while on phone with the on-call cardiologist she went into ventricular fibrillation. She was cardioverted x1 CPR was continued pulse check revealed a pulse and blood pressure of 86. She is started on amnio infusion after 150 mg bolus. Is this patient to be included in the SEP-1 core measure? No Exclusion criteria - the patient is NOT to be included for SEP-1 Core Measure due to: Infection is not suspected    Medical Decision Making  Amount and/or Complexity of Data Reviewed  Labs: ordered. Radiology: ordered. ECG/medicine tests: ordered. Risk  Prescription drug management. Decision regarding hospitalization. Critical Care:  Due to the immediate potential for life-threatening deterioration due to acute myocardial infarction, I spent 35 minutes providing critical care.   This time excludes time spent performing procedures but includes time spent on direct patient care, history retrieval, review of the chart, and discussions with patient, family, and consultant(s). Clinical Impression     1. ST elevation myocardial infarction (STEMI), unspecified artery (HCC)        Disposition     PATIENT REFERRED TO:  No follow-up provider specified. DISCHARGE MEDICATIONS:  New Prescriptions    No medications on file       DISPOSITION Decision To Admit 11/29/2023 05:48:46 PM        Diagnostic Results and Other Data       RADIOLOGY:  XR CHEST PORTABLE   Final Result      No acute cardiopulmonary findings. Electronically signed by Quincy Willams MD          LABS:   Results for orders placed or performed during the hospital encounter of 11/29/23   CBC with Auto Differential   Result Value Ref Range    WBC 5.7 4.0 - 11.0 K/uL    RBC 4.52 4.00 - 5.20 M/uL    Hemoglobin 12.2 12.0 - 16.0 g/dL    Hematocrit 38.1 36.0 - 48.0 %    MCV 84.4 80.0 - 100.0 fL    MCH 26.9 26.0 - 34.0 pg    MCHC 31.9 31.0 - 36.0 g/dL    RDW 13.1 12.4 - 15.4 %    Platelets 614 797 - 590 K/uL    MPV 8.6 5.0 - 10.5 fL    Neutrophils % 52.9 %    Lymphocytes % 33.3 %    Monocytes % 11.4 %    Eosinophils % 1.6 %    Basophils % 0.8 %    Neutrophils Absolute 3.0 1.7 - 7.7 K/uL    Lymphocytes Absolute 1.9 1.0 - 5.1 K/uL    Monocytes Absolute 0.6 0.0 - 1.3 K/uL    Eosinophils Absolute 0.1 0.0 - 0.6 K/uL    Basophils Absolute 0.0 0.0 - 0.2 K/uL   EKG 12 Lead   Result Value Ref Range    Ventricular Rate 114 BPM    QRS Duration 112 ms    Q-T Interval 368 ms    QTc Calculation (Bazett) 507 ms    R Axis 22 degrees    T Axis 28 degrees    Diagnosis       EKG performed in ER and to be interpreted by ER physician. Confirmed by MD, ER ([id number]),  JOSEF, [address] ([phone]) on 11/29/2023 5:37:38 PM     EKG   Acute lateral wall myocardial infarction    ED BEDSIDE ULTRASOUND:  No results found.     MOST RECENT VITALS:  BP: 124/68, , Pulse: (!) 123, Respirations: 23, SpO2: 98 %     Procedures     Code STEMI    ED Course     Nursing Notes, Past Medical Hx, Past Surgical Hx, Social Hx,Allergies, and Family Hx were

## 2023-11-30 LAB
EKG DIAGNOSIS: NORMAL
EKG Q-T INTERVAL: 360 MS
EKG QRS DURATION: 112 MS
EKG QTC CALCULATION (BAZETT): 489 MS
EKG R AXIS: 31 DEGREES
EKG T AXIS: -18 DEGREES
EKG VENTRICULAR RATE: 111 BPM
POC ACT LR: >400 SEC

## 2023-11-30 NOTE — PROCEDURES
CARDIAC CATHETERIZATION REPORT    Date of Procedure: 11/29/2023  : Mark Owens DO  Primary Indication: Ventricular fibrillation arrest, acute anterior/lateral ST elevation MI    Procedures Performed:  1. Coronary angiography  2. Left heart catheterization  3. Left ventriculography  4. Right heart catheterization  5. Impella CP insertion  6. IVUS of LAD  7. PTCA of LAD in-stent thrombosis  8. PTCA of ostial first diagonal artery  9. Ultrasound-guided right femoral artery access  10. Ultrasound-guided left femoral artery access  11. Ultrasound-guided right femoral vein access  12. Right femoral angiography  13. Left femoral angiography  14. Moderate conscious sedation    Procedural Details:  Access: Local anesthetic was given and access was obtained in the right femoral artery using a micropuncture technique and ultrasound-guidance and a 6F sheath was placed without difficulty. Additional local anesthetic was given and access was later obtained in the left femoral artery using a micropuncture technique and ultrasound guidance and a 6F sheath was placed without difficulty. The 6F left femoral arterial sheath was later upsized following serial dilatations over an Amplatz wire and a 14F Impella CP sheath was placed. Additional local anesthetic was given and access was also obtained in the right femoral vein using a micropuncture technique and ultrasound guidance and an 8F sheath was placed. Diagnostic: A 7F [place] Dipak catheter was used to perform the right heart catheterization. A 5F JR4 catheter and 6F XB3.5 guide catheter were used to perform selective right and left coronary angiography, respectively. A 5F pigtail catheter was used to perform the left heart catheterization and left ventriculography. Intervention:  Therapeutic ACT was achieved with the administration of IV heparin.   Using a 6F XB3.5 guide catheter, a 0.014 Runthrough wire was ultimately able to be advanced across the proximal LAD lesion diagonal artery and all but the distal apical LAD which still appeared to contain thrombus. Next, a 6F Opticross IVUS catheter was inserted over the wire and pull-back IVUS and demonstrated an under-expanded and mildly under-sized stent in the proximal vessel where the reference vessel diameter was ~4.2 mm. This area was therefore dilated again with a non-compliant 4.0 x 12 mm balloon at 12-14 sofiya. Following this, was improvement in flow in the LAD with JENNIFER 3 flow to the apex. There was a residual 20% in-stent restenosis and a 70% stenosis in the mid-vessel. There was JENNIFER 3 flow in the first diagonal artery which again was jailed by the proximal LAD stent and had a residual 80% ostial stenosis. At this point, the patient had been weaned off all vasopressors, and given the severe multivessel disease which also included an a calcified 80% ostial LCx stenosis, the decision was made to conclude the procedure and transfer the patient to a tertiary care center for future consideration for multivessel CABG. An injection taken through the Impella CP sheath showed sluggish flow in the left SFA. Left dorsalis peds pulse was dopplerable and it was ultimately felt that an antegrade sheath did not need to be placed. Hemostasis: At the end of the procedure, the right femoral femoral arterial sheath was sutured in place. The right femoral venous sheath was sutured in place with the Mearl Greening catheter inserted to a depth of 63 cm. The 14F Impella sheath was pealed away and the short tapered 9F sheath was inserted and sutured in place with the Impella CP inserted to a depth of 81 cm. Findings:  Hemodynamics:  A.  Right heart catheterization                   1. RA: 15 mmHg                   2. RV: 60/15 mmHg                   3. PA: 58/30 (38) mmHg                   4. PCWP: 26 mmHg                   5. Saturations: AO 97.5%, PA 48.3%, RA 51.1%                   6. Denisa CO: 2.87 L/min                   7. Denisa CI: 1.7

## 2023-11-30 NOTE — ED NOTES
Discharging pt from board per nursing supervisor. Pt aircare to [facility].      Umm Smith RN  11/29/23 [id number]

## 2023-11-30 NOTE — H&P
[address] [place] [address]   H&P  [phone]      Attending Physician: Melissa Mendiola DO  Reason for Consultation/Chief Complaint: V-fib arrest acute anterior/lateral ST elevation MI    Subjective   History of Present Illness:  Verlean Osler is a 77 y.o. female with a history of CAD, chronic LV systolic heart failure secondary to ischemic cardiomyopathy, atrial fibrillation, and CKD who presented with chest pain. The patient was admitted to [facility] on 11/9/23 with an acute anterior ST elevation MI. At that time she underwent PCI of her proximal LAD in-stent thrombosis with a MARYAN and kissing balloon PTCA of her LAD/diagonal bifurcation. TTE obtained during her admission showed an LVEF of 40-45%. She was discharged on Brilinta. She reports that she has been compliant with her medications and has been taking her Brilinta. However, yesterday she developed nausea and diarrhea and says that she had multiple episodes of vomiting. Earlier today, she developed substernal chest pressure and came to the ED for further evaluation. Initial EKG showed atrial fibrillation with anterior and lateral ST elevations. Shortly after this, she had a ventricular fibrillation arrest.  CPR was initiating and she received one shock, after which ROSC was obtained. She was subsequently taken to the cardiac cath lab for emergent invasive coronary angiography and revascularization. Past Medical History:   has no past medical history on file. Surgical History:   has no past surgical history on file. Social History:   reports that she has never smoked. She has never used smokeless tobacco. She reports current alcohol use. She reports that she does not use drugs. Family History:  family history is not on file. Home Medications:  Were reviewed and are listed in nursing record and/or below  Prior to Admission medications    Medication Sig Start Date End Date Taking?  Authorizing Provider   rivaroxaban (Shirlyjoseph Dill) 15 medications. Cath:  [facility] 11/9/23:  IMPRESSIONS:   1. Pt with recent PCI LAD/Cx now with abrupt CP and STEMI        Brought emergently to cath lab. Radial access aborted due to excessive tortuosity of subclavian      Severe obstructive coronary artery disease (Severe 100% acute thrombotic      in-stent occlusion of mid proximal LAD and D1 with JENNIFER 0 flow, moderate      diffuse disease of LCX and RCA)      Successful IVUS guided PCI of proximal LAD with MARYAN X1 with KBI of LAD-D2      with excellent angiographic results and JENNIFER III flow in branch and      distal vessels      Of note, first diag bifurcates immediately and was thrombotically      occluded as well. KBI and POBA to this vessel with good result and 30%      residual with JENNIFER 3 flow. Cx is patent with diffuse disease and with patent previous stent OM with      noted mild thrombus in this vessel as well on initial shots. Final angiography shows a widelly patent LAD which supplies colllaterals      to a small distal RCA branch as well. JENNIFER 3 flow. 2. Perclose to RFA. RECOMMENDATIONS:  Aspirin 81 mg daily. Plavix 75 mg (or change to   ticagrelor) daily -- reloaded. Integrelin. Hold noac for now. Optimize antianginals and aggressive therapy for coronary artery disease   Cardiac rehab   Admitted to CVICU   Continue current medical therapies   Discussed with referring team/physician. Other Studies:   Chest X-ray 11/29/23:  IMPRESSION:  No acute cardiopulmonary findings. Assessment:      1. Ventricular fibrillation arrest s/p ROSC  2. Acute anterior/lateral ST elevation MI  3. CAD with multiple previous PCIs  4. Chronic LV systolic heart failure/ischemic cardiomyopathy (LVEF 40-45% on most recent TTE)  5. Atrial fibrillation  6. CKD       Plan:     Load with aspirin 324 mg and 4,000 units IV heparin. Proceed to cardiac cath lab for emergent invasive coronary angiography and percutaneous coronary intervention.       Critical Care